# Patient Record
Sex: MALE | URBAN - METROPOLITAN AREA
[De-identification: names, ages, dates, MRNs, and addresses within clinical notes are randomized per-mention and may not be internally consistent; named-entity substitution may affect disease eponyms.]

---

## 2023-12-01 LAB
ESTIMATED AVERAGE GLUCOSE: NORMAL
HBA1C MFR BLD: 10.5 %

## 2024-02-26 ENCOUNTER — CLINICAL DOCUMENTATION (OUTPATIENT)
Dept: PHARMACY | Facility: CLINIC | Age: 49
End: 2024-02-26

## 2024-02-26 NOTE — PROGRESS NOTES
Pharmacy Pop Care Documentation:   Patient is missing the following requirements: DX: DM Type One or Type Two; Provider Documentation of DM Visit; A1C    If completed by 03/25/24 patient will be eligible for enrollment in the DM Program on 04/01/24.    Application Received: 2/26/24  Application scanned.      Emailed patient back the following to email application was sent from, [email or web address]    Thanks so much for taking the first step towards better health.     This letter is to inform you that we have received your enrollment form for the Live Well With Diabetes Program, but you are missing the following requirements or documentation:     Diagnosis of Diabetes Type One or Two - as per documentation from your Provider    Provider Visit for Diabetes within the last 12 months    A1C Result within the last 12 months    To qualify for this program the above requirements must be met by 03/25/24 for enrollment into the program on 04/01/24.  Results or visits obtained outside of [facility] will need to be provided by fax: [phone] or email: [email or web address] before the deadline in order to qualify for the program.     If requirements are not met by the date listed above, you will be not be enrolled in the program.   Our next enrollment will be 05/01/24 with a deadline to submit documentation by 04/25/24.    [facility] Clinical Pharm[facility]  Phone: toll free [phone] Option #3  Email: [email or web address]  Fax Number: [phone]       Geovanni Chester CPhT  [facility]  Clinical Pharmacy   Phone: [phone]       For Pharmacy Admin Tracking Only    Program: Pop Health  CPA in place:  No  Gap Closed?: No   Time Spent (min): 10

## 2024-03-04 ENCOUNTER — CLINICAL DOCUMENTATION (OUTPATIENT)
Dept: PHARMACY | Facility: CLINIC | Age: 49
End: 2024-03-04

## 2024-03-04 NOTE — PROGRESS NOTES
Pharmacy Pop Care Documentation:     AVS received for required office visit on:  8/11/23: Rachele Luciano per scanned document.

## 2024-03-04 NOTE — PROGRESS NOTES
DM OV, DX, and A1c received and scanned into chart. Separate encounter made for OV.     Pharmacy Pop Care Documentation:   Patient has completed all the requirements by 03/25/24 and therefore will be enrolled in the DM Program on 04/01/24.    Letter mailed to patient.      Jude Santana [facility]  Clinical Pharmacy   Phone: [phone]     For Pharmacy Admin Tracking Only    Program: Pop Health  CPA in place:  No  Gap Closed?: Yes   Time Spent (min): 15

## 2024-03-04 NOTE — PROGRESS NOTES
For Pharmacy Admin Tracking Only    Program: [facility]  CPA in place:  No  Gap Closed?: Yes   Time Spent (min): 5

## 2024-03-07 ENCOUNTER — TELEPHONE (OUTPATIENT)
Dept: PHARMACY | Facility: CLINIC | Age: 49
End: 2024-03-07

## 2024-03-07 NOTE — TELEPHONE ENCOUNTER
Taj called in about the Live Well with Diabetes program.     Patient states that he enrolled in the program under his wife's insurance. I informed him that the latest note on 3.4.24  states Patient has completed all the requirements by 03/25/24 and therefore will be enrolled in the DM Program on 04/01/24.     He also gave us permission to talk to his wife, Croina, about anything pertaining to the diabetes program.     Amairani Stockton [facility]  Population Health    [facility] Clinical Pharmacy   [phone] option 2     For Pharmacy Admin Tracking Only    Program: [employer]  CPA in place:  No  Gap Closed?: Yes   Time Spent (min): 5

## 2024-03-13 LAB
CHOLESTEROL, TOTAL: 145 MG/DL
CHOLESTEROL/HDL RATIO: NORMAL
HDLC SERPL-MCNC: 36 MG/DL (ref 35–70)
LDL CHOLESTEROL CALCULATED: 82 MG/DL (ref 0–160)
MICROALBUMIN/CREAT UR-RTO: 22
NONHDLC SERPL-MCNC: NORMAL MG/DL
TRIGL SERPL-MCNC: 170 MG/DL
VLDLC SERPL CALC-MCNC: NORMAL MG/DL

## 2024-03-14 LAB
ESTIMATED AVERAGE GLUCOSE: NORMAL
HBA1C MFR BLD: 10.9 %

## 2024-03-27 ENCOUNTER — TELEPHONE (OUTPATIENT)
Dept: PHARMACY | Facility: CLINIC | Age: 49
End: 2024-03-27

## 2024-03-27 NOTE — TELEPHONE ENCOUNTER
**Patient is Ensemble**     2024 Annual Pharmacist Visit    Called patient to schedule 2024 yearly pharmacist appointment to discuss medications for Diabetes Management Program.    Spoke to patient and scheduled appointment for 4/3/24 at 4:00pm.          Jude Santnaa [facility]  Clinical Pharmacy   Phone: [phone], option #3       For Pharmacy Admin Tracking Only    Program: [employer]  CPA in place:  No  Recommendation Provided To: Patient/Caregiver: 1 via Telephone  Intervention Detail: Scheduled Appointment  Intervention Accepted By: Patient/Caregiver: 1  Gap Closed?: Yes   Time Spent (min): 5

## 2024-04-03 ENCOUNTER — TELEPHONE (OUTPATIENT)
Dept: PHARMACY | Facility: CLINIC | Age: 49
End: 2024-04-03

## 2024-04-03 RX ORDER — AMLODIPINE BESYLATE 10 MG/1
10 TABLET ORAL DAILY
COMMUNITY
Start: 2024-03-05

## 2024-04-03 RX ORDER — ATORVASTATIN CALCIUM 40 MG/1
40 TABLET, FILM COATED ORAL DAILY
COMMUNITY
Start: 2024-03-05

## 2024-04-03 RX ORDER — LOSARTAN POTASSIUM AND HYDROCHLOROTHIAZIDE 12.5; 1 MG/1; MG/1
1 TABLET ORAL DAILY
COMMUNITY
Start: 2024-03-05

## 2024-04-03 RX ORDER — SEMAGLUTIDE 0.68 MG/ML
0.5 INJECTION, SOLUTION SUBCUTANEOUS WEEKLY
COMMUNITY

## 2024-04-03 RX ORDER — MELOXICAM 15 MG/1
15 TABLET ORAL DAILY
COMMUNITY
Start: 2024-03-05

## 2024-04-03 NOTE — TELEPHONE ENCOUNTER
[facility] CLINICAL PHARMACY REVIEW -LIVE WELL WITH DIABETES ([facility])  =================================================================  aTj Palumbo is a 48 y.o. male enrolled in the [facility] Live Well with Diabetes program.    Two attempts made to reach patient by telephone for pharmacist appointment. Left voice message for patient to return clinician's phone call to [phone], option 3. Will prepare Letter message and send to patient.    Ritesh NolenD, BCACP  Ambulatory Care Clinical Pharmacist- [facility] Clinical Pharmacy  Department, toll free: [phone]     For Pharmacy Admin Tracking Only    Program: Pop Health  Gap Closed?: No   Time Spent (min): 20

## 2024-04-04 ENCOUNTER — TELEPHONE (OUTPATIENT)
Dept: PHARMACY | Facility: CLINIC | Age: 49
End: 2024-04-04

## 2024-04-04 NOTE — TELEPHONE ENCOUNTER
Patient missed previously scheduled appointment with the pharmacist on 04.03.24.    **Patient is Ensemble **           2024 Annual Pharmacist Visit    Appointment scheduled for 4.5.24 @ 11:30 AM.    Amairani Stockton CPhT  Population Health    [facility] Clinical Pharmacy   [phone] option 2     For Pharmacy Admin Tracking Only    Program: [employer]  CPA in place:  No  Recommendation Provided To: Patient/Caregiver: 1 via Telephone  Intervention Detail: Scheduled Appointment  Intervention Accepted By: Patient/Caregiver: 1  Gap Closed?: Yes   Time Spent (min): 10

## 2024-04-05 NOTE — TELEPHONE ENCOUNTER
I have discussed the care of this patient with the resident and I agree with the assessment/plan as documented.    F/u letter sent to keyur Gates, PharmD, BCACP  Ambulatory Care Clinical Pharmacist- [facility] Clinical Pharmacy  Department, toll free: [phone]      For Pharmacy Admin Tracking Only    Program: [employer]  CPA in place:  No  Recommendation Provided To: Patient/Caregiver: 1 via Telephone  Intervention Detail: New Rx: 1, reason: Patient Preference  Intervention Accepted By: Patient/Caregiver: 1  Gap Closed?: Yes   Time Spent (min): 45    
Education to patient: Discussed general issues about diabetes pathophysiology and management., Addressed diet and exercise, Addressed medication adherence, Overview of Diabetes program- Benefits/Requirements, Overview of [facility], Benefit/indication for Statin in patients with diabetes, and Benefit/indication for ACE/ARB in patients with diabetes     - Follow up: PCP for identified gaps or as scheduled below    - Upcoming appointments: 06/12/2024    Ham Covarrubias, PharmD  PGY-1 Pharmacy Resident  [facility]  [facility] Clinical Pharmacy  Department, toll free: [phone], option 2

## 2024-06-13 LAB
ESTIMATED AVERAGE GLUCOSE: NORMAL
HBA1C MFR BLD: 10.1 %

## 2024-07-26 ENCOUNTER — TELEPHONE (OUTPATIENT)
Dept: PHARMACY | Facility: CLINIC | Age: 49
End: 2024-07-26

## 2024-07-26 NOTE — TELEPHONE ENCOUNTER
[facility] Employee Diabetes Program - Live Well With Diabetes    Quarterly Check-in  - Sent the following mychart/letter to patient  - Lizzy Anita updated    Congratulations! You have completed the following requirements needed to maintain enrollment in the Live Well With Diabetes Management program for 2024:        Meet with a [facility] Clinical Pharmacist at least once yearly  Visit with your physician (first yearly visit)  Visit with your physician (Second yearly visit)  First A1C  Second A1C  Lipid panel (once yearly)  Urine albumin (once yearly)    Please review the information below for further requirements that need to be completed for the remainder of the year.    To ensure participants are taking steps to control their condition ongoing requirements need to be completed. To continue to receive the Live Well With Diabetes Program benefits, the following actions must be taken:     Requirements to be completed by Dec. 31  Complete diabetes education or engage with an [facility] care manager.    *Documentation of any requirements completed or reviewed outside of the [facility] electronic medical record will need to be faxed or emailed (fax/email info below).    If the missing requirements(s) are not met by the date listed above, you will be disqualified from the program. If any patient is dis-enrolled from the program, then they must wait a full calendar year to re-enroll in the program.       [facility] Population Health Pharmacy   Phone: [phone] Option #3  Email: [email or web address]  Fax Number: [phone]      For Pharmacy Admin Tracking Only    Program: [employer]  Time Spent (min): 10

## 2024-07-29 ENCOUNTER — TELEPHONE (OUTPATIENT)
Dept: PHARMACY | Facility: CLINIC | Age: 49
End: 2024-07-29

## 2024-07-29 NOTE — TELEPHONE ENCOUNTER
[facility] Employee Diabetes Program - Live Well With Diabetes  =================================================================  Taj Palumbo is a 49 y.o. male enrolled in the [facility] Be Well with Diabetes program.    Identified care gap(s): A1c > 8%    DM Program Prescriptions:  Current Outpatient Medications   Medication Instructions    amLODIPine (NORVASC) 10 mg, Oral, DAILY    atorvastatin (LIPITOR) 40 mg, Oral, DAILY    losartan-hydroCHLOROthiazide (HYZAAR) 100-12.5 MG per tablet 1 tablet, Oral, DAILY    meloxicam (MOBIC) 15 mg, Oral, DAILY    metFORMIN (GLUCOPHAGE) 1,000 mg, Oral, 2 TIMES DAILY WITH MEALS    Ozempic (0.25 or 0.5 MG/DOSE) 0.5 mg, SubCUTAneous, WEEKLY        Allergies:  Not on File     Labs:    Lab Results   Component Value Date/Time    LABA1C 10.1 06/13/2024 12:00 AM    LABA1C 10.9 03/14/2024 12:00 AM    LABA1C 10.5 12/01/2023 12:00 AM    MALBCR 22 03/13/2024 12:00 AM      No results found for: \"[id number]\"      Diabetes Care:  - Glycemic Goal: <7.0%. Is not at blood glucose goal  - Current medication:  Per refill report, pt has been adherent to current therapy  Jardiance 10mg daily- Started in May 2024  Ozempic 2mg weekly-  Ozempic was started in March and has been quickly ramped up to the maximum dose.   Metformin 1000mg BID  - Despite medication adjustment, pt's A1c has stated the same at ~10%.    - Therapy Optimization: Pt had previously been on DE ANDA and Actos, but those were stopped a long time ago. At this point given his A1c, basal insulin would be the best recommendation.    Plan:  Attempt made to reach patient by telephone for diabetes review. Left voice message for patient to return clinician's phone call to [phone], option 3.     Would like to discuss:  - Is pt checking BG?  Was supposed to get glucometer at last OV, but nothing filled at [facility]  - consider CGM?  - Is he working to improve diet/increase activity level?  At [facility] visit this was

## 2024-09-30 ENCOUNTER — TELEPHONE (OUTPATIENT)
Dept: PHARMACY | Facility: CLINIC | Age: 49
End: 2024-09-30

## 2024-09-30 NOTE — TELEPHONE ENCOUNTER
[facility] Employee Diabetes Program - Live Well With Diabetes  =================================================================  Taj Palumbo is a 49 y.o. male enrolled in the [facility] Be Well with Diabetes program.    Identified care gap(s): A1c > 8%    DM Program Prescriptions:  Current Outpatient Medications   Medication Instructions    amLODIPine (NORVASC) 10 mg, Oral, DAILY    atorvastatin (LIPITOR) 40 mg, Oral, DAILY    losartan-hydroCHLOROthiazide (HYZAAR) 100-12.5 MG per tablet 1 tablet, Oral, DAILY    meloxicam (MOBIC) 15 mg, Oral, DAILY    metFORMIN (GLUCOPHAGE) 1,000 mg, Oral, 2 TIMES DAILY WITH MEALS    Ozempic (0.25 or 0.5 MG/DOSE) 0.5 mg, SubCUTAneous, WEEKLY        Allergies:  Not on File     Labs:  Lab Results   Component Value Date/Time    LABA1C 11.3 09/19/2024 12:00 AM    LABA1C 10.1 06/13/2024 12:00 AM    LABA1C 10.9 03/14/2024 12:00 AM    MALBCR 22 03/13/2024 12:00 AM      No results found for: \"EOX8SADA\"    Care Team:   Physician (PCP): non-[facility] (Last OV: unknonwn, likely september)  - Upcoming appointments:   No future appointments.    Diabetes Care:  - Glycemic Goal: <7.0%. Is not at blood glucose goal  - Current medication:  Per refill report, pt has been adherent to current therapy  Jardiance 10mg daily- Started in May 2024  Ozempic 2mg weekly-  Ozempic was started in March and has been quickly ramped up to the maximum dose.   Metformin 1000mg BID  - Despite medication adjustment, pt's A1c has continued to rise   - Therapy Optimization: Pt had previously been on DE ANDA and Actos, but those were stopped a long time ago. At this point given his A1c, basal insulin would be the best recommendation.     Plan:  Attempt made to reach patient by telephone for diabetes review. Left voice message for patient to return clinician's phone call to [phone], option 3.      Would like to discuss:  - Is pt checking BG?  Was supposed to get glucometer at last OV, but

## 2024-11-18 ENCOUNTER — TELEPHONE (OUTPATIENT)
Dept: PHARMACY | Facility: CLINIC | Age: 49
End: 2024-11-18

## 2024-11-18 NOTE — TELEPHONE ENCOUNTER
.[facility] Employee Diabetes Program - Live Well With Diabetes  =================================================================  Taj Palumbo is a 49 y.o. male enrolled in the [facility] Be Well with Diabetes program.    Identified care gap(s): A1c > 8%    DM Program Prescriptions:  Current Outpatient Medications   Medication Instructions    amLODIPine (NORVASC) 10 mg, Oral, DAILY    atorvastatin (LIPITOR) 40 mg, Oral, DAILY    losartan-hydroCHLOROthiazide (HYZAAR) 100-12.5 MG per tablet 1 tablet, Oral, DAILY    meloxicam (MOBIC) 15 mg, Oral, DAILY    metFORMIN (GLUCOPHAGE) 1,000 mg, Oral, 2 TIMES DAILY WITH MEALS    Ozempic (0.25 or 0.5 MG/DOSE) 0.5 mg, SubCUTAneous, WEEKLY        Allergies:  Not on File     Labs:    Lab Results   Component Value Date/Time    LABA1C 11.3 09/19/2024 12:00 AM    LABA1C 10.1 06/13/2024 12:00 AM    LABA1C 10.9 03/14/2024 12:00 AM    MALBCR 22 03/13/2024 12:00 AM      No results found for: \"[id number]\"    Care Team:   Physician (PCP): non-[facility] (Last OV: unknonwn, likely september)  - Upcoming appointments:   No future appointments.     Diabetes Care:  - Glycemic Goal: <7.0%. Is not at blood glucose goal  - Current medication:  Per refill report, pt has been adherent to current therapy  Jardiance 10mg daily- Started in May 2024  Ozempic 2mg weekly-  Ozempic was started in March and has been quickly ramped up to the maximum dose.   Metformin 1000mg BID  - Despite medication adjustment, pt's A1c has continued to rise   - Therapy Optimization: Pt had previously been on DE ANAD and Actos, but those were stopped a long time ago. At this point given his A1c, basal insulin would be the best recommendation.     Plan:  Attempt made to reach patient by telephone for diabetes review. Left voice message for patient to return clinician's phone call to [phone], option 3.      Would like to discuss:  - Is pt checking BG?  Was supposed to get glucometer at last OV, but

## 2024-11-21 NOTE — TELEPHONE ENCOUNTER
Spoke with pt    Admitted to non adherence to his medication regimen despite filling everything on time. He said he got busy and was missing doses pretty frequently 1-2months prior to his OV and A1c.  Reports that he has done much better over the last 2 months and is going to have another visit and A1c in mid-December.  Reports that he is checking his BG with a meter he bought at [facility].  Discussed covered options and suggested Prodigy brand.  Discussed CGM.  He said he did really want one. Discussed coverage, PA, and cost if approved.    Sending pt an email with information on CGM and free fingerstick options so that he can message his provider.  [email or web address]    ARIES Gates, PharmD, [facility]  Ambulatory Care Clinical Pharmacist- [facility] Clinical Pharmacy  Department, toll free: [phone]

## 2025-01-17 LAB
CHOLESTEROL, TOTAL: 136 MG/DL
CHOLESTEROL/HDL RATIO: ABNORMAL
ESTIMATED AVERAGE GLUCOSE: NORMAL
HBA1C MFR BLD: 9.8 %
HDLC SERPL-MCNC: 34 MG/DL (ref 35–70)
LDL CHOLESTEROL: 83
NONHDLC SERPL-MCNC: ABNORMAL MG/DL
TRIGL SERPL-MCNC: 99 MG/DL
VLDLC SERPL CALC-MCNC: ABNORMAL MG/DL

## 2025-02-21 ENCOUNTER — TELEPHONE (OUTPATIENT)
Dept: PHARMACY | Facility: CLINIC | Age: 50
End: 2025-02-21

## 2025-02-21 NOTE — TELEPHONE ENCOUNTER
**Patient is Ensemble**     2025 Annual Pharmacist Visit    Called patient to schedule 2025 yearly pharmacist appointment to discuss medications for Diabetes Management Program.    No answer. Left VM.     Please call back at [phone], option #3         Jude Santana [facility]  Clinical Pharmacy   Phone: [phone], option #3

## 2025-03-03 NOTE — TELEPHONE ENCOUNTER
Second attempt made to contact patient to schedule 2025 yearly pharmacist appointment to discuss medications for Diabetes Management Program.    No answer. Left VM.     Please call back at [phone], option #3.     Preparist message sent.        Jude Santana [facility]  Clinical Pharmacy   Phone: [phone], option #3       For Pharmacy Admin Tracking Only    Program: [employer]  CPA in place:  No  Gap Closed?: No   Time Spent (min): 5

## 2025-04-02 ENCOUNTER — CLINICAL DOCUMENTATION (OUTPATIENT)
Dept: PHARMACY | Facility: CLINIC | Age: 50
End: 2025-04-02

## 2025-04-02 NOTE — PROGRESS NOTES
[facility] Employee Diabetes Program - Live Well With Diabetes    Quarterly Check-in  Quarterly Reminder sent to patient for the DM Program - See Mychart message or Letter for more information  Sent Letter  Lizzy Chester CPhT  [facility]  Clinical Pharmacy   Phone: [phone], Option #3       For Pharmacy Admin Tracking Only    Program: [employer]  CPA in place:  No  Gap Closed?: No   Time Spent (min): 5     =======================================================    Mychart/Letter for participant:      Thanks so much for taking the first step towards better health.    To ensure participants are taking steps to control their condition, ongoing requirements need to be completed. To continue to receive the Live Well With Diabetes Program benefit for 2025, and be eligible in 2026, the following actions must be taken:     Requirements to be completed by 7/1/25  Meet with a [facility] Clinical Pharmacist at least once yearly  Visit with your physician (First yearly visit)  First A1C    Requirements to be completed by 12/31/25  Visit with your physician (Second yearly visit)  Second A1C  Lipid panel (once yearly)  Urine Microalbumin (once yearly)  Taking a Statin, have a contraindication, or a Provider override  Taking an ACEi/ARB, have a contraindication, or a Provider override    Requirement due by 12/31/25 if A1C is greater than 8 percent:  Engage with a Clinical pharmacy specialists by phone at least 2 times, or complete some form of diabetes education through your provider    *Documentation of any requirements completed or reviewed outside of the [facility] electronic medical record will need to be faxed or emailed (fax/email info below).    If the missing requirements(s) are not met by the date listed above, you will be disqualified from the program and will not receive program benefits. If any patient is

## 2025-04-16 ENCOUNTER — TELEPHONE (OUTPATIENT)
Dept: PHARMACY | Facility: CLINIC | Age: 50
End: 2025-04-16

## 2025-04-16 NOTE — TELEPHONE ENCOUNTER
**2nd attempt to contact patient**    **Patient is Ensemble**    Called patient to schedule 2025 yearly pharmacist appointment to discuss medications for Diabetes Management Program.      No answer. Left VM: Please call back at [phone] Option #3.       Von Santana [facility]  Clinical Pharmacy   Department, toll free: [phone] Option #3  
Patient called back and scheduled appointment for 4/18/25 at 1:00pm.       Jude Santana [facility]  Clinical Pharmacy   Phone: [phone], Option #3       For Pharmacy Admin Tracking Only    Program: [employer]  CPA in place:  No  Recommendation Provided To: Patient/Caregiver: 1 via Telephone  Intervention Detail: Scheduled Appointment  Intervention Accepted By: Patient/Caregiver: 1  Gap Closed?: Yes   Time Spent (min): 5   
Follow up

## 2025-04-18 ENCOUNTER — TELEPHONE (OUTPATIENT)
Dept: PHARMACY | Facility: CLINIC | Age: 50
End: 2025-04-18

## 2025-04-18 NOTE — TELEPHONE ENCOUNTER
[facility] CLINICAL PHARMACY REVIEW - Live Well with Diabetes Program ([employer])    Taj Palumbo is a 49 y.o. male enrolled in the [facility] Employee Diabetes Program. Patient provided writer with verbal consent to remain in the program for this year. Patient enrolled 4/1/2024.    Communication preferences (for >8% followup, urgent messages, etc)  Preferred methods: Phone and Mychart  Preferred days/time: anytime     Medications:  *Reviewed with patient  Current Outpatient Medications   Medication Instructions    amLODIPine (NORVASC) 10 mg, DAILY    atorvastatin (LIPITOR) 40 mg, DAILY    Jardiance 10 mg, DAILY    losartan-hydroCHLOROthiazide (HYZAAR) 100-12.5 MG per tablet 1 tablet, DAILY    meloxicam (MOBIC) 15 mg, DAILY    metFORMIN (GLUCOPHAGE) 1,000 mg, 2 TIMES DAILY WITH MEALS    Ozempic (2 MG/DOSE) 2 mg, EVERY 7 DAYS    phentermine 37.5 mg, EVERY MORNING    tiZANidine (ZANAFLEX) 4 mg, 2 TIMES DAILY PRN     Pharmacy and Supplies Information  Current Pharmacy: [facility]    Current medications eligible for copay waiver, up to $600, through [facility] Pharmacy:  - Amlodipine, Atorvastatin, Jardiance, Losartan/hctz, Metformin, Ozempic   -  Prodigy    Allergies:  *Reviewed with patient  Not on File   Vitals/Labs:  BP Readings from Last 3 Encounters:   No data found for BP       Lab Results   Component Value Date    LABA1C 9.8 01/17/2025    LABA1C 11.3 09/19/2024    LABA1C 10.1 06/13/2024     No results found for: \"TDS4IVMK\"    Lab Results   Component Value Date    CHOL 136 01/17/2025    TRIG 99 01/17/2025    HDL 34 (A) 01/17/2025    LDL 83 01/17/2025     No results found for: \"ALT\", \"AST\"  The ASCVD Risk score (Gretel DK, et al., 2019) failed to calculate for the following reasons:    The systolic blood pressure is missing    The smoking status is invalid    Unable to determine if patient is Non-      No results found for: \"CREATININE\"  CrCl

## 2025-04-21 RX ORDER — SEMAGLUTIDE 2.68 MG/ML
2 INJECTION, SOLUTION SUBCUTANEOUS
COMMUNITY
Start: 2025-02-20

## 2025-04-21 RX ORDER — EMPAGLIFLOZIN 10 MG/1
10 TABLET, FILM COATED ORAL DAILY
COMMUNITY
Start: 2024-05-01

## 2025-04-21 RX ORDER — PHENTERMINE HYDROCHLORIDE 37.5 MG/1
37.5 CAPSULE ORAL EVERY MORNING
COMMUNITY
Start: 2025-01-16

## 2025-06-09 ENCOUNTER — TELEPHONE (OUTPATIENT)
Dept: PHARMACY | Facility: CLINIC | Age: 50
End: 2025-06-09

## 2025-06-09 NOTE — TELEPHONE ENCOUNTER
call to [phone], option 3. Will prepare Letter message and send to patient.    Would like to discuss the following:  Tolerating the Mounjaro 2.5mg?  He is due for a refill and should have titrated to 5mg  How have BG been running?  Recommended testing fingerstick readings. Unable to get cgm covered     ARIES Gates, PharmD, BCACP  Ambulatory Care Clinical Pharmacist- [facility] Clinical Pharmacy  Department, toll free: [phone]    For Pharmacy Admin Tracking Only    Program: [employer]  Time Spent (min): 15